# Patient Record
Sex: MALE | Race: OTHER | URBAN - METROPOLITAN AREA
[De-identification: names, ages, dates, MRNs, and addresses within clinical notes are randomized per-mention and may not be internally consistent; named-entity substitution may affect disease eponyms.]

---
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
## 2024-01-15 ENCOUNTER — TELEPHONE (OUTPATIENT)
Facility: CLINIC | Age: 19
End: 2024-01-15

## 2024-01-15 NOTE — TELEPHONE ENCOUNTER
1st Reminder letter was sent to patient mychart for orders pending:     US ABDOMEN COMPLETE (CPT=76700) (Order #[id number]) on 10/10/23

## 2025-02-11 ENCOUNTER — PATIENT MESSAGE (OUTPATIENT)
Dept: SURGERY | Facility: CLINIC | Age: 20
End: 2025-02-11

## 2025-02-11 ENCOUNTER — OFFICE VISIT (OUTPATIENT)
Dept: SURGERY | Facility: CLINIC | Age: 20
End: 2025-02-11
Payer: COMMERCIAL

## 2025-02-11 VITALS
HEIGHT: 69 IN | SYSTOLIC BLOOD PRESSURE: 117 MMHG | BODY MASS INDEX: 26.66 KG/M2 | WEIGHT: 180 LBS | HEART RATE: 71 BPM | DIASTOLIC BLOOD PRESSURE: 75 MMHG

## 2025-02-11 DIAGNOSIS — N47.1 PHIMOSIS: Primary | ICD-10-CM

## 2025-02-11 PROCEDURE — 99204 OFFICE O/P NEW MOD 45 MIN: CPT | Performed by: UROLOGY

## 2025-02-11 PROCEDURE — 3074F SYST BP LT 130 MM HG: CPT | Performed by: UROLOGY

## 2025-02-11 PROCEDURE — 3078F DIAST BP <80 MM HG: CPT | Performed by: UROLOGY

## 2025-02-11 PROCEDURE — 3008F BODY MASS INDEX DOCD: CPT | Performed by: UROLOGY

## 2025-02-11 NOTE — PROGRESS NOTES
SUBJECTIVE:  Antonio Montero is a 19 year old male who presents for a consultation at the request of, and a copy of this note will be sent to, Oscar Machuca, for evaluation of  phimosis. He states that the problem is unchanged. Symptoms include progressive difficulty retracting foreskin.. He denies any other complaints.    Allergies: Allergies[1]    History:  No past medical history on file.   No past surgical history on file.   Family History   Problem Relation Age of Onset    Diabetes Father     Diabetes Maternal Grandmother     Hypertension Maternal Grandmother     Hypertension Maternal Grandfather     Diabetes Paternal Grandmother     Diabetes Paternal Grandfather       Social History:   Social History     Socioeconomic History    Marital status: Single   Tobacco Use    Smoking status: Never    Smokeless tobacco: Never   Vaping Use    Vaping status: Never Used   Substance and Sexual Activity    Alcohol use: Never    Drug use: Never            REVIEW OF SYSTEMS:  RESPIRATORY:  Negative for chest tightness, wheezing, cough, shortness of breath,  sputum production,chest pain or pleurisy.  CARDIOVASCULAR:  Negative for pain or chest discomfort, dizziness or fainting, palpitations, leg swelling, nocturia, or claudication.  GASTROINTESTINAL:  Negative for nausea, vomiting, diarrhea, constipation, heartburn or indigestion, abdominal pains, bloody or tarry stools.  GENERAL: Denies:  weight gain, weight loss, fever, night sweats, bone pain, malaise, and fatigue. Positive for:  none.  All other review of systems reviewed and otherwise negative    OBJECTIVE:  /75 (BP Location: Left arm, Patient Position: Sitting, Cuff Size: large)   Pulse 71   Ht 5' 9\" (1.753 m)   Wt 180 lb (81.6 kg)   BMI 26.58 kg/m²   He appears well, in no apparent distress.  Alert and oriented times three, pleasant and cooperative.  CARDIOVASCULAR:normal apical impulse  RESPIRATORY: no chest wall deformities or tenderness  ABDOMEN: abdomen  is soft without significant tenderness, masses, organomegaly or guarding  GENITOURINARY:      Penis: no penile lesions or discharge. Meatus normal location and size. Phimosis noted.      Scrotum: normal in appearance      Right Epididymis and Vas: both are palpably normal.      Right Testis: normal        Left Epididymis and Vas: both are palpably normal.      Left Testis: normal        Inguinal Lymph Nodes: non-palpable both      ASSESSMENT/PLAN  Encounter Diagnosis   Name Primary?    Phimosis Yes   Reviewed findings at length with patient but recommended circumcision.  Risks and side effects reviewed.  He will consider and call us back to schedule once ready to do so.    No orders of the defined types were placed in this encounter.      Meds This Visit:  Requested Prescriptions      No prescriptions requested or ordered in this encounter       Imaging & Referrals:  None                        [1]   Allergies  Allergen Reactions    Kiwi Extract HIVES

## (undated) NOTE — LETTER
1/15/2024              Antonio Montero        [address] [address]         Dear Antonio,    Our records indicate that the tests ordered for you by Katelyn Oconnell PA-C  have not been done.      US ABDOMEN COMPLETE (CPT=76700) (Order #[id number]) on 10/10/23     To schedule a test, call Central Scheduling at [phone]      If you have, in fact, already completed the tests or you do not wish to have the tests done, please contact our office at THE NUMBER LISTED BELOW.  Otherwise, please proceed with the testing.  Enclosed is a duplicate order for your convenience.        Sincerely,    Katelyn Oconnell PA-C  [facility]  [address]  [facility] [id number] [phone]